# Patient Record
Sex: MALE | Race: WHITE
[De-identification: names, ages, dates, MRNs, and addresses within clinical notes are randomized per-mention and may not be internally consistent; named-entity substitution may affect disease eponyms.]

---

## 2018-04-20 ENCOUNTER — HOSPITAL ENCOUNTER (OUTPATIENT)
Dept: HOSPITAL 56 - MW.SDS | Age: 63
Discharge: HOME | End: 2018-04-20
Attending: SURGERY
Payer: MEDICARE

## 2018-04-20 DIAGNOSIS — K57.30: ICD-10-CM

## 2018-04-20 DIAGNOSIS — M19.90: ICD-10-CM

## 2018-04-20 DIAGNOSIS — Z88.0: ICD-10-CM

## 2018-04-20 DIAGNOSIS — L93.0: ICD-10-CM

## 2018-04-20 DIAGNOSIS — Z90.49: ICD-10-CM

## 2018-04-20 DIAGNOSIS — F17.210: ICD-10-CM

## 2018-04-20 DIAGNOSIS — Z79.899: ICD-10-CM

## 2018-04-20 DIAGNOSIS — I10: ICD-10-CM

## 2018-04-20 DIAGNOSIS — K63.5: Primary | ICD-10-CM

## 2018-04-20 PROCEDURE — 45380 COLONOSCOPY AND BIOPSY: CPT

## 2018-04-20 PROCEDURE — 88305 TISSUE EXAM BY PATHOLOGIST: CPT

## 2018-04-20 NOTE — PCM.OPNOTE
- General Post-Op/Procedure Note


Date of Surgery/Procedure: 04/20/18


Operative Procedure(s): Colonoscopy with cold mid & distal sigmoid polypectomy


Pre Op Diagnosis: Change in bowel habits.  Intermittent rectal bleeding


Post-Op Diagnosis: Mid & distal colon polyps.  Pancolonic diverticulosis


Anesthesia Technique: MAC (ASA II)


Primary Surgeon: Dyao Castle


Assistant: Jone Stanley


Condition: Good


Free Text/Narrative:: 





Dictation [id number]





CPT CODE 44909

## 2018-04-20 NOTE — [id number]
Post Anesthesia Note





- EVALUATION WITHIN 48HRS OF ANESTHETIC


Vital Signs in Normal Range: Yes


Patient Participated in Evaluation: Yes


Respiratory Function Stable: Yes


Airway Patent: Yes


Cardiovascular Function Stable: Yes


Hydration Status Stable: Yes


Pain Control Satisfactory: Yes


Nausea and Vomiting Control Satisfactory: Yes


Mental Status Recovered: Yes


Resp Rate: 13





- COMMENTS/OBSERVATIONS


Free Text/Narrative:: 





no anesthesia problems

## 2018-04-20 NOTE — PCM.PREANE
Preanesthetic Assessment





- Anesthesia/Transfusion/Family Hx


Anesthesia History: Prior Anesthesia Without Reaction


Family History of Anesthesia Reaction: No


Transfusion History: No Prior Transfusion(s)


Intubation History: Unknown





- Review of Systems


General: No Symptoms


Pulmonary: No Symptoms


Cardiovascular: No Symptoms


Gastrointestinal: Diarrhea, Hematochezia


Neurological: No Symptoms


Other: Reports: None





- Physical Assessment


Height: 1.83 m


Weight: 78.471 kg


ASA Class: 2


Mental Status: Alert & Oriented x3


Airway Class: Mallampati = 2


Dentition: Reports: Dentures (upper and lower)


Thyro-Mental Finger Breadths: 3


Mouth Opening Finger Breadths: 3


ROM/Head Extension: Full


Lungs: Clear to Auscultation, Normal Respiratory Effort


Cardiovascular: Regular Rate, Regular Rhythm





- Allergies


Allergies/Adverse Reactions: 


 Allergies











Allergy/AdvReac Type Severity Reaction Status Date / Time


 


Penicillins Allergy  Cannot Verified 04/18/18 09:49





   Remember  














- Blood


Blood Available: No





- Anesthesia Plan


Pre-Op Medication Ordered: None





- Acknowledgements


Anesthesia Type Planned: MAC


Pt an Appropriate Candidate for the Planned Anesthesia: Yes


Alternatives and Risks of Anesthesia Discussed w Pt/Guardian: Yes


Pt/Guardian Understands and Agrees with Anesthesia Plan: Yes





PreAnesthesia Questionnaire


HEENT History: Reports: Other (See Below)


Other HEENT History: wears glasses


Cardiovascular History: Reports: Hypertension


Gastrointestinal History: Reports: Chronic Diarrhea, Hemorrhoids


Musculoskeletal History: Reports: Arthritis, Back Pain, Chronic


Immunologic History: Reports: Other (See Below)


Other Immunologic History: Lupus, mostly only skin affected


Dermatologic History: Reports: Other (See Below)


Other Dermatologic History: occasional rash due to lupus





- Past Surgical History


Head Surgeries/Procedures: Reports: None


GI Surgical History: Reports: Appendectomy


Musculoskeletal Surgical History: Reports: Other (See Below)


Other Musculoskeletal Surgeries/Procedures:: tendon repair rt arm





- SUBSTANCE USE


Smoking Status *Q: Current Every Day Smoker (smokes one pack per week)


Tobacco Use Within Last Twelve Months: Cigarettes


Days Per Week of Alcohol Use: 7


Number of Drinks Per Day: 4


Total Drinks Per Week: 28


Recreational Drug Use History: Yes


Recreational Drug Type: Reports: Marijuana/Hashish





- HOME MEDS


Home Medications: 


 Home Meds





Cholecalciferol (Vitamin D3) [Vitamin D3] 2,000 units PO DAILY 04/18/18 [History

]


Clobetasol [Clobetasol Propionate 0.05%] 1 applic TOP ASDIRECTED PRN 04/18/18 [

History]


Cyanocobalamin (Vitamin B-12) [Cyanocobalamin Injection] 1 injection IM [facility] 

04/18/18 [History]


Lisinopril 20 mg PO DAILY 04/18/18 [History]











- CURRENT (IN HOUSE) MEDS


Current Meds: 





 Current Medications





Lactated Ringer's (Ringers, Lactated)  1,000 mls @ 125 mls/hr IV ASDIRECTED ERVIN





Discontinued Medications





Propofol (Diprivan  20 Ml) Confirm Administered Dose 200 mg .ROUTE .STK-MED ONE


   Stop: 04/20/18 11:55

## 2018-04-23 NOTE — OR
SURGEON:

Dayo Castle M.D.

 

DATE OF PROCEDURE:  04/20/2018

 

OPERATION PERFORMED:

Colonoscopy with cold mid and distal sigmoid polypectomy.

 

SURGEON:

Dayo Castle M.D.

 

FIRST ASSISTANT:

Dr. Stanley, PGY-2.

 

ANESTHESIA:

MAC.

 

ASA CLASSIFICATION:

II.

 

PREOPERATIVE DIAGNOSES:

1. Change in bowel habits.

2. Intermittent rectal bleeding.

 

POSTOPERATIVE DIAGNOSES:

1. Sigmoid polyps x2.

2. Pancolonic diverticulosis.

 

DESCRIPTION OF PROCEDURE:

The patient was taken to the endoscopy room positioned on the endoscopy table in

the left lateral decubitus position.  Time-out was called for appropriate

identification of the patient and procedure.  Monitored anesthesia care was

provided.  The colonoscope was inserted into the rectum and advanced with

significant difficulty to the cecum, where ultimately we were able to retroflex

the colonoscope in the cecum.  The colonoscope was then straightened and slowly

withdrawn.  The patient demonstrates pancolonic diverticulosis with diverticula

beginning in the proximal ascending colon and becoming worse as we moved

distally in the colon.  The cecum, ascending colon, hepatic flexure, transverse

colon, splenic flexure, and descending colon showed no tumors or polyps.  No

angiodysplastic changes were noted.  There was no evidence of inflammatory bowel

disease.  Two small polyps were encountered in the sigmoid colon.  One in the

midportion and one in the distal portion.  Both were able to be removed with the

cold biopsy forceps and sent for separate histologic analysis.  Once the

colonoscope was withdrawn to the rectum, it was retroflexed to visualize the

anal orifice from above.  Again, no tumors or polyps were seen, and there were

no acute hemorrhoidal changes.  The colonoscope was then straightened, the

rectum aspirated, and the colonoscope removed.

 

The patient tolerated the procedure well and was taken to recovery room in

stable condition.

CASSIUS / LEON

DD:  04/20/2018 13:39:15

DT:  04/20/2018 19:51:24

Job #:  [id number]